# Patient Record
(demographics unavailable — no encounter records)

---

## 2019-11-26 NOTE — NUR
PT SITTING UP AT BEDSIDE. NO AUCTE RESP DISTRESS NOTED ON RA. PT DENIES ANY
ABDOMINAL PAIN AT THIS TIME. PT DENIES ANY N/V. IV TO RFA PATENT AND INFUSING.
IV ANTIBIOTICS INFUSING AS ORDERED. RANDDOM BLOOD GLUCOSE TAKEN .
FAMILY AT BEDSIDE. WILL CONTINUE TO MONITOR. CALL LIGHT IN REACH. BED IN
LOWEST POSITION.

## 2019-11-26 NOTE — NUR
PT SITTING UP IN BED. NO ACUTE RESP DISTRESS NOTED ON RA. PT DENIES ANY PAIN
AT THIS TIME. PT DENIES ANY N/V. COOLING MEASURES IN PLACE. IV TO RFA PATENT
AND INFUSING. NO REDNESS OR SWELLING NOTED. FAMILY AT BEDSIDE. WILL ENDORSE
CARE TO NIGHT SHIFT RN. CALL LIGHT IN REACH. BED IN LOWEST POSITION.

## 2019-11-26 NOTE — NUR
RECEIVED PT FROM ED. A/OX4. DENIES ANY HA OR DIZZZNESS AT THIS TIME. MED SURG.
DENIES ANY CHEST PAIN/PRESSURE AT THIS TIME. RESPIRATIONS EQUAL AND UNLABORED
ON RA. DENIES ANY SOB. PT STATES CAME IN FOR EPIGASTRIC PAIN, BUT SINCE
RECEIVING PAIN MEDS IN ED PAIN HAS RESOLVED. PT DENIES ANY N/V AT THIS TIME.
FAMILY AT BEDSIDE. MEDICATION RECONCILLATION UPDATED. WILL CONTINUE TO
MONITOR. CALL LIGHT IN REACH. BED IN LOWEST POSITION.

## 2019-11-26 NOTE — NUR
BACK FROM SURGERY ACCOMPANIED BY OR NURSES, AWAKE AND ORIENTED. MEDOCATED IN
OR FOR POST OPERATIVE PAIN. MORPHINE 2MG IV GIVEN BEFORE PATIENT TRANSPORTED
TO HER ROOM. WITH 5 INCISION STAPLED WITH DERMABOND, NO BLEEDONG NOTED. VITAL
SIGNS BP-102.68, P-85, R-17, T-97.8, SATTING AT 97% RA, 4/10 PAIN LEVEL. WILL
CONTINUE TO MONITOR,

## 2019-11-26 NOTE — NUR
RECEIVED PATIEN TIN BED AWAKE, ALERT AND ORIENTED WITH NO C/O ABDOMINAL
DISCOMFORT AT THIS TIME. ABDOMEN ROUND AND NONTENDER WITH ACTIVE BOWEL SOUNDS.
BREATHING EASY AND NONLABOR SATTING AT 95% RA. IV TO RFA INTACT AND INFUSING
WELL WITH NO SIGN OF INFILTRATION. INSTRUCTED ON NPO FOR POSSIBLE
SURGERY,CONSENT DIGNED. IV TO RFA INTACT AND INFUSINGW ELL. WILL CONTINUE TO
MONITOR. FANILY MEMBERS AT BEDSIDE.

## 2019-11-26 NOTE — NUR
PT SITTING UP IN BED. IV TO RAC UNABLE TO FLUSH. IV REMOVED CATHETER INTACT.
NEW IV STARTED TO LFA. IV INFILTRATED WHEN FLUIDS WERE INITIATED

## 2019-11-26 NOTE — NUR
PT ASKING TO HAVE FLU VACCINE ADMINISTERED AFTER SURGERY. SPOKE WITH CARLOS DOUGLAS TO CHANGE ADMINISTRATION TIME, PER STELLA THERES A 3 HOUR WINDOW AND
SHOULD BE OKAY TO GIVE MEDICATION LATER THIS EVENING.

## 2019-11-26 NOTE — NUR
PT SITTING UP AT BEDSIDE. NO ACUTE RESP DISTRESS NOTED ON RA. CONSENT FOR
SURGERY OBTAINED. CONSENT FOR BLOOD TRANSFUSION OBTAINED. TEMPERATURE
REASSESSED WAS 99.7 ORAL. COOLING MEASURES IN PLACE. PT STILL ASKING FOR FLU
VACCINE ONCE SURGERY IS COMPLETE. SPOKE WITH PHARMACIST ANNETTE TO CHANGE TIME
TO LATER TONIGHT. FAMILY AT BEDSIDE. WILL CONTINUE TO MONITOR. CALL LIGHT IN
REACH. BED IN LOWEST POSITION.

## 2019-11-26 NOTE — NUR
PT SITTING UP IN BED. NO ACUTE RESP DISTRESS NOTED ON RA. TEMPERATURE CHECKED
.2. COOLING MEASURES PROVIDED. MEDICATED PER EMAR. IV PATENT AND
INFUSING. NO REDNESS OR SWELLING NOTED. WILL CONTINUE TO MONITOR. CALL LIGHT
IN REACH. BED IN LOWEST POSITION.

## 2019-11-26 NOTE — NUR
REPORT GIVEN TO FRANCISCO IN OR. ALL QUESTIONS AND CONCERNS ADDRESSED. PT
BEING TAKEN OFF FLOOR AROUND 1500. FLACO WIPES GIVEN TO PT.

## 2019-11-26 NOTE — NUR
PT PRESENTED TO ED FOR SOB, EPIGASTRIC ABD PAIN THAT RADIATES TO MIDSTERNAL
CHEST AND GENERALIZED BACK SINCE 9 PM. PT STATES PAIN WORSENS WHEN LAYING DOWN.
PT STATES "WHEN I GET THE PAIN IT GOES INTO MY BACK AND FEELS LIKE MY LUNGS ARE
BEING PRESSED". PT REPORTS FEELING "BLOATED" IN EPIGASTRIC AREA AND STATES
"THIS HAS BEEN GOING ON FOR OVER A YEAR, I WENT TO MY DOCTOR AND THEY PUT THE
MONITOR ON ME OVERNIGHT. THEY SAID NOTHING WAS WRONG WITH MY HEART". PT DENIES
ANY NAUSEA, VOMITING, DIARRHEA. PT A&0X4, SPEAKING FULL CLEAR SENTENCES. PT
BREATHING EVEN AND UNLABORED. AWAITING MSE. CM AND 02 MONITOR IN PLACE. PT
NOTED NSR ON MONITOR. WILL CONTINUE TO MONITOR.

## 2019-11-26 NOTE — NUR
ABX COMPLETED. PT RESTING WELL SPEAKING FULL CLEAR SENTENCES. STATES 0/10 PAIN.
DAUGHTER AT BEDSIDE.

## 2019-11-26 NOTE — NUR
PT TRANSPORTED TO TELE FLOOR VIA [facility] ON PORTABLE CM BY JADE VAZ AND ZHANNA VAZ. CAROLINE VAZ RESUMED CARE OF PT

## 2019-11-27 NOTE — NUR
SPOKE TO DR TEAGUE AT THIS TIME. PER DR TEAUGE, OBTAIN CONSENT FOR
ERCP FOR TONIGHT. PATIENT SPOKE TO DR TEAGUE ON THE PHONE AND CONSENTS TO
PROCEDURE. ALL QUESTIONS AND CONCERNS ADDRESSED. ALL NEEDS ATTENDED TO. WILL
CONTINUE TO MONITOR

## 2019-11-27 NOTE — NUR
IV TO RFA INFILTRATED, REINSERTED TO LEFT HAND INTACT AN DINFUSING
WELL. PATIENT WALKS TO BATHROOM WITH  ASSISTANCE AND VOIDED.
IS AT BEDSIDE, INSTRUCTION GIVEN.

## 2019-11-27 NOTE — NUR
PATIENT RESTING COMFORTABLY IN BED AT THIS TIME. NO APPARENT DISTRESS OR
DISCOMFORT NOTED. IV PATENT AND INTACT. X5 INCISIONS TO ABD WITH DERMABOND IN
PLACE. ALL QUESTIONS AND CONCERNS ADDRESSED.  ALL NEEDS ATTENDED TO. SAFETY
PRECAUTIONS MAINTAINED. WILL ENDORSE ALL CARE TO NIGHT SHIFT NURSE

## 2019-11-27 NOTE — NUR
C/O POST OPERATIVE PAIN X2 THE ENTIRE SHIFT AND MEDICATED AS PRESCRIBED. ALL
NEEDS ATTENDED. CHECKED AT INTERVALS FOR NEEDS AND SAFETY.

## 2019-11-27 NOTE — NUR
MORNING MEDICATIONS ADMINISTERED. PATIENT C/O ABD PAIN AND MEDICATED WITH
ULTRAM. PATIENT TOLERATED WELL. NO APPARENT ADVERSE EFFECTS NOTED. ALL NEEDS
ATTENDED TO. WILL CONTINUE TO MONITOR

## 2019-11-27 NOTE — NUR
AWAKE C/O POST OPERATIVE PAIN AT SCALE OF 7/10, MORPHINE 2MG IVP GIVEN AS
PRESCRIBED. WILL CONTINUE TO MONITOR.

## 2019-11-27 NOTE — NUR
PATIENT C/O 7/10 ABD PAIN AT THIS TIME. PATIENT MEDICATED WITH MORPHINE IVP.
PATIENT TOLERATED WELL. NO APPARENT ADVERSE EFFECTS NOTED. ALL NEEDS ATTENDED
TO. WILL CONTINUE TO MONITOR

## 2019-11-27 NOTE — NUR
RECEIVED BEDSIDE REPORT FROM NIGHT SHIFT NURSE AT THIS TIME. PATIENT RESTING
COMFORTABLY IN BED. FAMILY AT BEDSIDE. NO APPARENT DISTRESS OR DISCOMFORT
NOTED. BREATHING EVEN AND UNLABORED. NO RESPIRATORY DISTRESS OR DISCOMFORT
NOTED. PATIENT DENIES CHEST PAIN/PRESSURE AT THIS TIME. S/P LAP MARGARITO WITH X5
INCISIONS WITH SUTURES AND DERMABOND. PATIENT C/O MILD DISCOMFORT, BUT STATES
WAS JUST MEDICATED WITH MORPHINE AND PAIN IS TOLERABLE. IV PATENT AND INTACT.
ALL QUESTIONS AND CONCERNS ADDRESSED. ALL NEEDS ATTENDED TO. WILL CONTINUE TO
MONITOR

## 2019-11-27 NOTE — NUR
PATIENT BACK FROM ERCP AT THIS TIME. ALL NEEDS ATTENDED TO. VITAL SIGNS
STABLE. WILL CONTINUE TO MONITOR

## 2019-11-27 NOTE — NUR
PT RECIEVED AAO WITH FAMILY AT THE BEDSIDE,ABDO IS SOFT OBESE WITH ACTIVE
BOWEL SOUNDS,PT HAS FIVE INCISION WITH DERMABOUND WITH SITE INTACT,PER
PATIENT BURLPING BUT NO PASSING AGS YET,PT BEING ENCOURAGE TO AMBULATE,AND
DO THE INCENTIVE SPIROMETER,PT HAS SCDS TO BLE,BED IN THE LOW POSITION AND
LOCKED,CALL LIGHT EASY REACHED AND WILL CONTINUE TO MONITOR.

## 2019-11-27 NOTE — NUR
PATIENT SITTING UP IN BED EATING LUNCH AT THIS TIME. PATIENT TOLERATING DIET
WELL. NO APPARENT DISTRESS NOTED. ALL NEEDS ATTENDED TO. WILL CONTINUE TO
MONITOR

## 2019-11-28 NOTE — NUR
DR. PIERRE IS SPEAKING WITH PATIENT ABOUT PLAN OF CARE. PATIENT VERBALIZED
UNDERSTANDING. DR. PIERRE ORDERED LIPASE AND LFT AND TO CALL HIM WHEN
RESULTS ARE BACK. PATIENT IS STABLE AT THIS TIME. MILD DISCOMFORT TO ABDOMEN,
TOLERABLE AND NO NEED FOR PAIN MEDICATION AT THIS TIME. REMAINS ON ROOM AIR,
NO RESPIRATORY DISTRESS NOTED. FAMILY AT BEDSIDE. SAFETY PRECAUTION IN PLACE.
WILL CONTINUE TO MONITOR.

## 2019-11-28 NOTE — NUR
PT RECEIVED A/O X4, ABLE TO MAKE NEEDS KNOWN. FAMILY AT BEDSIDE. MED-SURG, PT
DENIES ANY CP/PRESSURE. PULSES PALPABLE, NO EDEMA PRESENT. BREATHING IS EVEN
AND UNLABORED, NO RESP DISTRESS NOTED. ABD SOFT AND ROUND, BOWEL TONES ACTIVE
X4 QUAD, DENIES N/V. S/P LAP MARGARITO ON 11/26. ABD INCISIONS X4 WITH SUTURES AND
DERMABOND, ROLANDO. VOIDS FREELY, BRP. AMBULATORY WITH STEADY GAIT. PT DENIES
HAVING ANY PAIN AT THIS TIME. IVF INFUSING WELL TO , SITE WNL. NO ACUTE
DISTRESS NOTED. BED IN LOWEST SETTING, SIDE RAILS UP X2, CALL LIGHT WITHIN
REACH. WILL CONT TO MONITOR.

## 2019-11-28 NOTE — NUR
PATIENT COMPLAINING OF PAIN TO ABDOMEN S/P LAP MARGARITO INCISIONS. NORCO 7.5MG PO
GIVEN. TOLERATED WELL. WILL CONTINUE TO MONITOR.

## 2019-11-28 NOTE — NUR
SPOKE WITH DR. ORTIZ REGARDING ERCP RESULTS. NO NEW ORDERS AT THIS TIME.
WILL CONTINUE TO MONITOR PATIENT.

## 2019-11-28 NOTE — NUR
DR. PIERRE MADE AWARE OF PT LIPASE AND LFT RESULTS. NO NEW ORDERS AT THIS
TIME. WILL CONTINUE TO MONITOR.

## 2019-11-28 NOTE — NUR
PATIENT IS COMPLAINING OF 9/10 PAIN TO ABDOMEN, S/P LAP MARGARITO. NORCO 7.5 MG PO
GIVEN. TOLERATED WELL. ENCOURAGED PATIENT TO AMBULATE AS TOLERATED. WILL
CONTINUE TO MONITOR.

## 2019-11-28 NOTE — NUR
PATIENT IS AAOX4. PATIENT IN BED, NO ACUTE RESPIRATORY DISTRESS NOTED. PATIENT
IS COMPLAINING OF LEFT UPPER CHEST PAIN THAT RADIATES BELOW THE LEFT SHOULDER.
REASSURED PATIENT THAT PAIN MEDICATION WILL BE GIVEN AFTER CHECKING THE
ORDER. TROP NEGATIVE. IV TO RAC INTACT. NO INFILTRATION NOTED. SAFETY
PRECAUTION IN PLACE. WILL CONTINUE TO MONITOR.

## 2019-11-28 NOTE — NUR
PATIENT IS AMBULATING AROUND THE UNIT. MILD DISCOMFORT TO ABDOMEN, TOLERABLE.
PATIENT STATED THAT SHE HAS STARTED TO PASS A LITTLE GAS. EDUCATED THAT
AMBULATION WILL STIMULATE BOWEL AND WILL HELP WITH PASSING GAS. PATIENT
VERBALIZED UNDERSTANDING. IV REMAINS INTACT AND PATENT. LR RUNNING 
ML/HR. NO ACUTE RESPIRATORY DISTRESS AT THIS TIME. FAMILY AT BEDSIDE. WILL
CONTINUE TO MONITOR.

## 2019-11-28 NOTE — NUR
PATIENT AAOX4. COMPLAINTS OF PAIN TO INCISIONS S/P LAP CHOLECYSTECTOMY.
PATIENT IS NPO AT THIS TIME. HIDA SCAN ORDERED AT 0700 11/28/2019. NO ACUTE
RESPIRATORY DISTRESS NOTED. REASSURED PATIENT THAT PAIN MED WILL BE GIVEN AS
SOON AS HIDA SCAN HAS BEEN PERFORMED. PATIENT ON LACTATED RINGER 120 ML/HR. IV
TO LEFT HAND, NO INFILTRATION NOTED, PATENT AND INTACT. NO CHEST PAIN OR
PRESSURE NOTED. SAFETY PRECAUTION IN PLACE. CALL LIGHT WITHIN REACH. FAMILY AT
BEDSIDE. WILL CONTINUE TO MONITOR.

## 2019-11-28 NOTE — NUR
PATIENT IN BED. NO ACUTE RESPIRATORY DISTRESS NOTED. TOLERABLE DISCOMFORT TO
ABDOMEN. NO NEED FOR PAIN MEDICATION AT THIS TIME. LR RUNNING AT 120ML/HR AS
ORDERED. IV INTACT AND PATENT. NO INFILTRATION NOTED. FAMILY AT BEDSIDE.
SAFETY PRECAUTION. CALL LIGHT WITHIN REACH. WILL ENDORSE CARE TO NIGHT SHIFT
NURSE.

## 2019-11-29 NOTE — NUR
RECEIVED PT FROM NIGHT SHIFT. PT SITTING UPRIGHT IN BED HAVING BREAKFAST,
AOX4, RESP E/U ON RA. DENIES ABD PAIN OR NAUSEA AT THIS TIME. IV TO LAC W/ NO
SIGNS OF INFILTRATION, IVF INFUSING WELL. SX INCISIONS TO ABD X5 S/P ERCP W/
DERMABOND CDI. BED IN LOWEST POSITION AND CALL LIGHT WITHIN REACH. WILL
CONTINUE TO MONITOR.

## 2019-11-29 NOTE — NUR
PT C/O 8/10 ABD PAIN AFTER AMBULATING TO BATHROOM, PRN NORCO GIVEN AS ORDERED.
NO ACUTE DISTRESS NOTED. SPOUSE AT BEDSIDE. WILL CONT TO MONITOR.

## 2019-11-29 NOTE — NUR
PT DISCHARGED. REVIEWED DISCHARGE PACKET W/ PT INCLUDING NEW RX MEDS AND
FOLLOW UP INSTRUCTIONS. PT AOX4, RESP E/U ON RA, VS STABLE, MEDICATED AS ORDER
PER EMAR FOR ABD PAIN OVER SX INCISION SITES. IV TO LAC REMOVED, CATH INTACT,
GAUZE DRESSING APPLIED. PT AMBULATORY TO [facility], ESCORTED BY CNA JAVIER W/ NO
ACUTE INCIDENCE.

## 2019-11-29 NOTE — NUR
PT SLEPT WELL THROUGHOUT THE EVENING. BREATHING IS EVEN AND UNLABORED, NO RESP
DISTRESS NOTED. PT REPORTS GOOD PAIN RELIEF FROM NORCO AND DENIES HAVING ANY
PAIN AT THIS TIME. PT REPORTS PASSING GAS MULTIPLE TIMES DURING THE EVENING,
BUT DENIES HAVING A BM. NO ACUTE CHANGES ENCOUNTERED DURING SHIFT. ALL NEEDS
MET AND ANTICIPATED. PT COMPLIANT WITH NURSING CARE. SPOUSE AT BEDSIDE. IVF
INFUSING WELL, SITE WNL. CALL LIGHT WITHIN REACH. WILL ENDORSE CARE TO AM
NURSE.

## 2019-11-29 NOTE — NUR
Initial Nutrition Assessment:
 
 Dx: epigastric pain, chest pain, abdominal pain, cholecystitis
 PMHx: HTN, DM2, neuropathy
 PSHx: none per patient
Labs: (11/29) Na 139, K 3.4, Glu 92, BUN 6.0, Cr 0.6, H/H 11.2/33
 Meds: Colace, D5%, Glucagon diagnostic kit, Glucophage, Glucotrol, Klor-con,
Lactated Ringers, Morphine sulfate, Neurotin, Norco, Protonix, Tenormin,
Zofran, Zosyn
Diet: CL CCHO
PO intake since admission: (11/28) L: 100%, D: 100%
 Ht: 165.1 cm / 65 inches / 5'5" Wt: 95.85 kg / 211 pounds BMI: 36.2 kg/m2,
obesity class 2
IBW: 125 pounds / 57 kg %IBW:  168% AdjBW: 146 pounds / 67 kg UBW: Unk
 Age: 64
 Food Allergies: NKFA
 Skin: warm, dry, intact  Roberth: 21
 Edema: none
 GI: soft, round, tender to MUQ, denies N/V, active BS, c/o on and off
epigastric pain
Last BM: 11/25/19, soft
 
 
RD Note (11/29): Per H&P, Pt is a 65 y/o F who has 1 year history of
epigastric and abdominal pain. According to Pt, for about a year now she has
been having off and on epigastric and abdominal pain. Pain worsens only when
she eats greasy foods and/or chile. She reports feeling bloated.
 
RDN visited with Pt. Pt's family and friends were at bedside. Pt reports
tolerating clear liquids at this time, feels that she may be able to tolerate
full liquids and likely solid foods. Pt say she was craving coffee and toast
since that is what she always has for breakfast at home. She says she tries to
avoid tomato due to gastric discomfort. RDN encouraged Pt to limit foods that
lead to heartburn and gastric discomfort such as greasy foods, hot foods,
acidic foods; Pt verbalizes understanding. Pt also says that she'd like to
lose wt and is trying to lose ~40#. RDN provided Pt with DM and general
healthy eating guidelines. Pt is very motivated.
 
 
 Problem with:  N/V/D/C: Constipation; Per Pt she is passing gas but has not
had a BM, last BM was Tuesday morning
 Problems with: Chewing:  NO Swallowing:  NO
 Current appetite: GOOD
Recent wt change: None; Has been maintaining wt; however, has a goal to lose
wt, would like to lose ~40#
Vitamin/Supplement use: None
Special diet at home: Regular
Physical activity: Walking
Nutrition education given (specify specific nutrition education and handout
given): Pt was educated on general healthy diet with emphasis on methods for
weight loss (reduction of 500 kcal/day) and diabetic diet. Pt verbalizes
understanding. RDN provided information for [facility] DM Diet Education Classes.
 
 Food-drug interactions? N/A Education given? N/A
 
 Estimated Nutritional Needs Based on adjusted body weight (67 kg)
 Energy: 8694-1793 kcal/day (25-30 kcal/kg for adult maintenance)
 Protein: 54-67 g/day (0.8-1 g/kg for adult maintenance)
 Fluid: 0576-5182 mL/day (1 mL/kcal)
 
Nutrition Diagnosis:
1. Inadequate protein/energy intakes related to current diet order of clear
liquids as evidenced by Pt meeting < 75% estimated nutrient needs.
 
Intervention
1. Continue on CCHO CL as medically appropriate.
2. If/when medically appropriate, consider initiating CCHO diet per MD
discretion.
 
Monitor/Evaluate
 Goal: PO intake at least 75% of estimated needs
 Monitor: PO intake, Labs, GI function
 F/U in 2-3 days as high risk (12/01-02)

## 2019-11-29 NOTE — NUR
Intervention
1. Continue on CCHO CL as medically appropriate.
2. If/when medically appropriate, consider initiating CCHO diet per MD
discretion.

## 2019-11-29 NOTE — NUR
CALLED AND SPOKE TO BRYANT(N.P.) AND CONFIRMED WITH HER THAT PT IS OKAY TO
DISCHARGE HOME TODAY.
ROOPA VAZ ASSIGNED TO THIS PT MADE AWARE OF ABOVE.

## 2019-11-29 NOTE — NUR
ACCUCHECK DONE AT THIS TIME, BLOOD GLUCOSE: 59. PT AOX4, DENIES HEADACHE,
DROWSINESS/WEAKNESS OR SOB. NP BRYANT NOTIFIED. D50 GIVEN AS ORDERED PER
EMAR. BED IN LOWEST POSITION AND CALL LIGHT WITHIN REACH. WILL CONTINUE TO
MONITOR.